# Patient Record
Sex: FEMALE | Race: WHITE | NOT HISPANIC OR LATINO | URBAN - METROPOLITAN AREA
[De-identification: names, ages, dates, MRNs, and addresses within clinical notes are randomized per-mention and may not be internally consistent; named-entity substitution may affect disease eponyms.]

---

## 2019-01-08 PROBLEM — Z00.00 ENCOUNTER FOR PREVENTIVE HEALTH EXAMINATION: Status: ACTIVE | Noted: 2019-01-08

## 2019-01-09 ENCOUNTER — OUTPATIENT (OUTPATIENT)
Dept: OUTPATIENT SERVICES | Facility: HOSPITAL | Age: 34
LOS: 1 days | Discharge: HOME | End: 2019-01-09

## 2019-01-09 ENCOUNTER — APPOINTMENT (OUTPATIENT)
Dept: OBGYN | Facility: CLINIC | Age: 34
End: 2019-01-09

## 2019-01-09 ENCOUNTER — NON-APPOINTMENT (OUTPATIENT)
Age: 34
End: 2019-01-09

## 2019-01-09 VITALS
HEART RATE: 86 BPM | RESPIRATION RATE: 18 BRPM | SYSTOLIC BLOOD PRESSURE: 126 MMHG | TEMPERATURE: 98 F | DIASTOLIC BLOOD PRESSURE: 74 MMHG

## 2019-01-09 VITALS
RESPIRATION RATE: 18 BRPM | DIASTOLIC BLOOD PRESSURE: 73 MMHG | SYSTOLIC BLOOD PRESSURE: 111 MMHG | HEART RATE: 85 BPM | TEMPERATURE: 98 F

## 2019-01-09 VITALS
HEIGHT: 64 IN | WEIGHT: 215 LBS | BODY MASS INDEX: 36.7 KG/M2 | SYSTOLIC BLOOD PRESSURE: 120 MMHG | DIASTOLIC BLOOD PRESSURE: 80 MMHG

## 2019-01-09 DIAGNOSIS — O24.919 UNSPECIFIED DIABETES MELLITUS IN PREGNANCY, UNSPECIFIED TRIMESTER: ICD-10-CM

## 2019-01-09 DIAGNOSIS — Z98.890 OTHER SPECIFIED POSTPROCEDURAL STATES: Chronic | ICD-10-CM

## 2019-01-09 DIAGNOSIS — Z34.90 ENCOUNTER FOR SUPERVISION OF NORMAL PREGNANCY, UNSPECIFIED, UNSPECIFIED TRIMESTER: ICD-10-CM

## 2019-01-09 DIAGNOSIS — O26.893 OTHER SPECIFIED PREGNANCY RELATED CONDITIONS, THIRD TRIMESTER: ICD-10-CM

## 2019-01-09 DIAGNOSIS — Z87.09 PERSONAL HISTORY OF OTHER DISEASES OF THE RESPIRATORY SYSTEM: ICD-10-CM

## 2019-01-09 DIAGNOSIS — Z78.9 OTHER SPECIFIED HEALTH STATUS: ICD-10-CM

## 2019-01-09 LAB
APPEARANCE UR: ABNORMAL
BILIRUB UR QL STRIP: NEGATIVE
BILIRUB UR-MCNC: NEGATIVE — SIGNIFICANT CHANGE UP
CLARITY UR: CLEAR
COD CRY URNS QL: ABNORMAL /HPF
COLLECTION METHOD: NORMAL
COLOR SPEC: YELLOW — SIGNIFICANT CHANGE UP
DIFF PNL FLD: NEGATIVE — SIGNIFICANT CHANGE UP
EPI CELLS # UR: ABNORMAL /HPF
GLUCOSE BLDC GLUCOMTR-MCNC: 137 MG/DL — HIGH (ref 70–99)
GLUCOSE UR QL: NEGATIVE — SIGNIFICANT CHANGE UP
GLUCOSE UR-MCNC: NEGATIVE
HCG UR QL: NEGATIVE EU/DL
HCT VFR BLD CALC: 32.7 % — LOW (ref 37–47)
HGB BLD-MCNC: 10.5 G/DL — LOW (ref 12–16)
HGB UR QL STRIP.AUTO: NEGATIVE
KETONES UR-MCNC: ABNORMAL
KETONES UR-MCNC: NEGATIVE
LEUKOCYTE ESTERASE UR QL STRIP: NEGATIVE
LEUKOCYTE ESTERASE UR-ACNC: ABNORMAL
MCHC RBC-ENTMCNC: 23.6 PG — LOW (ref 27–31)
MCHC RBC-ENTMCNC: 32.1 G/DL — SIGNIFICANT CHANGE UP (ref 32–37)
MCV RBC AUTO: 73.5 FL — LOW (ref 81–99)
NITRITE UR QL STRIP: NEGATIVE
NITRITE UR-MCNC: NEGATIVE — SIGNIFICANT CHANGE UP
NRBC # BLD: 0 /100 WBCS — SIGNIFICANT CHANGE UP (ref 0–0)
PH UR STRIP: 7
PH UR: 6 — SIGNIFICANT CHANGE UP (ref 5–8)
PLATELET # BLD AUTO: 261 K/UL — SIGNIFICANT CHANGE UP (ref 130–400)
PROT UR STRIP-MCNC: NEGATIVE
PROT UR-MCNC: ABNORMAL
RBC # BLD: 4.45 M/UL — SIGNIFICANT CHANGE UP (ref 4.2–5.4)
RBC # FLD: 14.3 % — SIGNIFICANT CHANGE UP (ref 11.5–14.5)
SP GR SPEC: >=1.03 — SIGNIFICANT CHANGE UP (ref 1.01–1.03)
SP GR UR STRIP: 1.03
UROBILINOGEN FLD QL: 1 (ref 0.2–0.2)
WBC # BLD: 5.84 K/UL — SIGNIFICANT CHANGE UP (ref 4.8–10.8)
WBC # FLD AUTO: 5.84 K/UL — SIGNIFICANT CHANGE UP (ref 4.8–10.8)
WBC UR QL: SIGNIFICANT CHANGE UP /HPF

## 2019-01-09 NOTE — OB PROVIDER TRIAGE NOTE - HISTORY OF PRESENT ILLNESS
32 yo  @36w4d w/ EDC 19 by LMP presents to L&D after being sent from [facility] for uncontrolled GDMA2. Pt is late transfer from [facility] where she reports she was receiving prenatal care. Pt reports pregnancy 32 yo  @36w4d w/ EDC 19 by LMP presents to L&D after being sent from [facility] for uncontrolled GDMA2. Pt is late transfer from [facility] where she reports she was receiving prenatal care. Pt reports pregnancy complicated by GDMA2. Currently taking Metformin 500mg BID, although occasionally skips PM dose. Pt does not perform fasting fingersticks, post prandial between 130-210. Fingerstick in clinic 86. Denies ctx, LOF, VB. Reports good fetal movement. Was checked in clinic, found to be 1.5/50/-3. Otherwise denies any other complications during this pregnancy. GBS unknown.

## 2019-01-09 NOTE — OB PROVIDER TRIAGE NOTE - NS_OBGYNHISTORY_OBGYN_ALL_OB_FT
Ob hx:   #1: , FT, , M, 3000gms, GDMA1  #2: 2017, FT, , M, 2000gms, GDMA2, IVF  SABx2, d&cx1    gyn hx: denies h/o cysts, fibroids, abnormal paps, STIs

## 2019-01-09 NOTE — OB PROVIDER TRIAGE NOTE - NSOBPROVIDERNOTE_OBGYN_ALL_OB_FT
34 yo  @36w4d, GBS unknown, late transfer, GDMA2 on metformin 500mg BID, questionable noncompliance, 34 yo  @36w4d, GBS unknown, late transfer, GDMA2 on metformin 500mg BID, questionable noncompliance,    - follow up at next scheduled clinic 32 yo  @36w4d, GBS unknown, late transfer, GDMA2 on metformin 500mg BID, questionable noncompliance,    - follow up at next scheduled clinic    OB ATTENDIN yo  at 36w4d GA by LMP, went today for 1st prenatal [place] in the [place], no prenatal records. Pt was dx with GDMA2 during her pregnancy, taking metformin 500mg BID, FS -210, does not check fasting. Otherwise uncomplicated prenatal care. Sent today from clinic since FS are not controlled according to pts verbal report, FS in clinic was 86. FS in L&D 137 (2h PP). Pt denies any symptoms, good fetal movements. Irregular ctx, felt only as tightening according to the pt. OB  x2 both complicated with GDM, #1 diet controlled, #2 on metformin. VS WNL, Reactive NST, irregular contractions, sonogram EFW 78%, BPP 8/8, MVP normal, vtx. Pt appears to be not well controlled but she needs to have a better FS log to be able to assess control and need for insulin to better control her glucose. Currently FS elevated but pt is stable, is not decompensated due to FS, and reassuring fetal wellbeing, adequate EFW, not polyhydramnios. Pt instructed on how to keep FS log, she has an official sonogram scheduled for tomorrow, will arrange for her to be seen in [facility] to continue prenatal care and to get diabetes education by diabetes nurse there. No need for inhouse monitoring or emergent FS control. Will draw and send prenatal labs. Pt not in labor, reexamined and found to be unchanged. Precautions given, fetal kick counts. 32 yo  @36w4d, GBS unknown, late transfer, GDMA2 on metformin 500mg BID, questionable noncompliance, not in labor, reassuring fetal and maternal status.    - f/up prenatal lab work  - antepartum sonogram tomorrow @1130  - f/up in high risk clinic, appointment to be made  - fasting and postprandial fingersticks  - c/w metformin 500mg BID  -  labor precautions  - [facility]KC instructions, maternal hydration and healthy diet encouraged    Dr. Friend and Dr. Loyd aware.     OB ATTENDIN yo  at 36w4d GA by LMP, went today for 1st [place] in the [place], no prenatal records. Pt was dx with GDMA2 during her pregnancy, taking metformin 500mg BID, FS -210, does not check fasting. Otherwise uncomplicated prenatal care. Sent today from clinic since FS are not controlled according to pts verbal report, FS in clinic was 86. FS in L&D 137 (2h PP). Pt denies any symptoms, good fetal movements. Irregular ctx, felt only as tightening according to the pt. OB  x2 both complicated with GDM, #1 diet controlled, #2 on metformin. VS WNL, Reactive NST, irregular contractions, sonogram EFW 78%, BPP 8/8, MVP normal, vtx. Pt appears to be not well controlled but she needs to have a better FS log to be able to assess control and need for insulin to better control her glucose. Currently FS elevated but pt is stable, is not decompensated due to FS, and reassuring fetal wellbeing, adequate EFW, not polyhydramnios. Pt instructed on how to keep FS log, she has an official sonogram scheduled for tomorrow, will arrange for her to be seen in [facility] to continue prenatal care and to get diabetes education by diabetes nurse there. No need for inhouse monitoring or emergent FS control. Will draw and send prenatal labs. Pt not in labor, reexamined and found to be unchanged. Precautions given, fetal kick counts.

## 2019-01-09 NOTE — OB PROVIDER TRIAGE NOTE - NSHPPHYSICALEXAM_GEN_ALL_CORE
Vital Signs Last 24 Hrs  T(F): 97.6 (09 Jan 2019 19:29), Max: 97.6 (09 Jan 2019 19:29)  HR: 86 (09 Jan 2019 19:29) (86 - 86)  BP: 126/74 (09 Jan 2019 19:29) (126/74 - 126/74)  RR: 18 (09 Jan 2019 19:29) (18 - 18)    udip: trace blood, trace protein    EFM: 130/mod kishan/+accel  toco: irregular  SVE: deferred  abd: soft, nontender, gravid, no palpable contractions Vital Signs Last 24 Hrs  T(F): 97.6 (09 Jan 2019 19:29), Max: 97.6 (09 Jan 2019 19:29)  HR: 86 (09 Jan 2019 19:29) (86 - 86)  BP: 126/74 (09 Jan 2019 19:29) (126/74 - 126/74)  RR: 18 (09 Jan 2019 19:29) (18 - 18)    udip: trace blood, trace protein    admission fingerstick: 137  EFM: 130/mod kishan/+accel  toco: irregular  SVE: deferred  abd: soft, nontender, gravid, no palpable contractions Vital Signs Last 24 Hrs  T(F): 97.6 (09 Jan 2019 19:29), Max: 97.6 (09 Jan 2019 19:29)  HR: 86 (09 Jan 2019 19:29) (86 - 86)  BP: 126/74 (09 Jan 2019 19:29) (126/74 - 126/74)  RR: 18 (09 Jan 2019 19:29) (18 - 18)    udip: trace blood, trace protein    admission fingerstick: 137  EFM: 130/mod kishan/+accel  toco: irregular  SVE: 2/50/-3  abd: soft, nontender, gravid, no palpable contractions

## 2019-01-09 NOTE — OB PROVIDER TRIAGE NOTE - ADDITIONAL INSTRUCTIONS
please take your metformin twice daily.   please take your fingerstick every morning before eating and 2 hours after breakfast, lunch, dinner.   Ultrasound appointment at 1130 on 1/10.   We will call you with your next clinic appointment.

## 2019-01-10 ENCOUNTER — APPOINTMENT (OUTPATIENT)
Dept: ANTEPARTUM | Facility: CLINIC | Age: 34
End: 2019-01-10

## 2019-01-10 LAB
ALBUMIN SERPL ELPH-MCNC: 3.5 G/DL — SIGNIFICANT CHANGE UP (ref 3.5–5.2)
ALP SERPL-CCNC: 133 U/L — HIGH (ref 30–115)
ALT FLD-CCNC: 8 U/L — SIGNIFICANT CHANGE UP (ref 0–41)
ANION GAP SERPL CALC-SCNC: 17 MMOL/L — HIGH (ref 7–14)
AST SERPL-CCNC: 11 U/L — SIGNIFICANT CHANGE UP (ref 0–41)
BILIRUB SERPL-MCNC: <0.2 MG/DL — SIGNIFICANT CHANGE UP (ref 0.2–1.2)
BLD GP AB SCN SERPL QL: SIGNIFICANT CHANGE UP
BUN SERPL-MCNC: 8 MG/DL — LOW (ref 10–20)
CALCIUM SERPL-MCNC: 9 MG/DL — SIGNIFICANT CHANGE UP (ref 8.5–10.1)
CHLORIDE SERPL-SCNC: 103 MMOL/L — SIGNIFICANT CHANGE UP (ref 98–110)
CO2 SERPL-SCNC: 19 MMOL/L — SIGNIFICANT CHANGE UP (ref 17–32)
CREAT SERPL-MCNC: <0.5 MG/DL — LOW (ref 0.7–1.5)
ESTIMATED AVERAGE GLUCOSE: 117 MG/DL — HIGH (ref 68–114)
GLUCOSE BLDC GLUCOMTR-MCNC: 87 MG/DL — SIGNIFICANT CHANGE UP (ref 70–99)
GLUCOSE SERPL-MCNC: 104 MG/DL — HIGH (ref 70–99)
HBA1C BLD-MCNC: 5.7 % — HIGH (ref 4–5.6)
HBV SURFACE AG SER-ACNC: SIGNIFICANT CHANGE UP
HIV 1 & 2 AB SERPL IA.RAPID: SIGNIFICANT CHANGE UP
POTASSIUM SERPL-MCNC: 4 MMOL/L — SIGNIFICANT CHANGE UP (ref 3.5–5)
POTASSIUM SERPL-SCNC: 4 MMOL/L — SIGNIFICANT CHANGE UP (ref 3.5–5)
PRENATAL SYPHILIS TEST: SIGNIFICANT CHANGE UP
PROT SERPL-MCNC: 6.2 G/DL — SIGNIFICANT CHANGE UP (ref 6–8)
RUBV IGG SER-ACNC: 3.1 INDEX — SIGNIFICANT CHANGE UP
RUBV IGG SER-IMP: POSITIVE — SIGNIFICANT CHANGE UP
SODIUM SERPL-SCNC: 139 MMOL/L — SIGNIFICANT CHANGE UP (ref 135–146)
TYPE + AB SCN PNL BLD: SIGNIFICANT CHANGE UP
VZV IGG FLD QL IA: 365.3 INDEX — SIGNIFICANT CHANGE UP
VZV IGG FLD QL IA: POSITIVE — SIGNIFICANT CHANGE UP

## 2019-01-11 DIAGNOSIS — Z3A.39 39 WEEKS GESTATION OF PREGNANCY: ICD-10-CM

## 2019-01-11 LAB
C TRACH RRNA SPEC QL NAA+PROBE: NOT DETECTED
CULTURE RESULTS: SIGNIFICANT CHANGE UP
N GONORRHOEA RRNA SPEC QL NAA+PROBE: NOT DETECTED
SOURCE AMPLIFICATION: NORMAL
SPECIMEN SOURCE: SIGNIFICANT CHANGE UP

## 2019-01-12 LAB
HEMOGLOBIN INTERPRETATION: SIGNIFICANT CHANGE UP
HGB A MFR BLD: 98.4 % — HIGH (ref 95.8–98)
HGB A2 MFR BLD: 1.6 % — LOW (ref 2–3.2)

## 2019-01-13 LAB
GAMMA INTERFERON BACKGROUND BLD IA-ACNC: 0.01 IU/ML — SIGNIFICANT CHANGE UP
M TB IFN-G BLD-IMP: NEGATIVE — SIGNIFICANT CHANGE UP
M TB IFN-G CD4+ BCKGRND COR BLD-ACNC: 0 IU/ML — SIGNIFICANT CHANGE UP
M TB IFN-G CD4+CD8+ BCKGRND COR BLD-ACNC: 0 IU/ML — SIGNIFICANT CHANGE UP
QUANT TB PLUS MITOGEN MINUS NIL: 8.65 IU/ML — SIGNIFICANT CHANGE UP

## 2019-01-14 LAB
GP B STREP DNA SPEC QL NAA+PROBE: NORMAL
GP B STREP DNA SPEC QL NAA+PROBE: NOT DETECTED
HPV HIGH+LOW RISK DNA PNL CVX: NOT DETECTED
SOURCE GBS: NORMAL

## 2019-02-01 ENCOUNTER — INPATIENT (INPATIENT)
Facility: HOSPITAL | Age: 34
LOS: 2 days | Discharge: HOME | End: 2019-02-04
Attending: OBSTETRICS & GYNECOLOGY | Admitting: OBSTETRICS & GYNECOLOGY

## 2019-02-01 ENCOUNTER — OUTPATIENT (OUTPATIENT)
Dept: OUTPATIENT SERVICES | Facility: HOSPITAL | Age: 34
LOS: 1 days | End: 2019-02-01
Payer: MEDICAID

## 2019-02-01 VITALS — TEMPERATURE: 99 F

## 2019-02-01 DIAGNOSIS — Z98.890 OTHER SPECIFIED POSTPROCEDURAL STATES: Chronic | ICD-10-CM

## 2019-02-01 LAB
BASOPHILS # BLD AUTO: 0.03 K/UL — SIGNIFICANT CHANGE UP (ref 0–0.2)
BASOPHILS NFR BLD AUTO: 0.6 % — SIGNIFICANT CHANGE UP (ref 0–1)
BLD GP AB SCN SERPL QL: SIGNIFICANT CHANGE UP
EOSINOPHIL # BLD AUTO: 0.18 K/UL — SIGNIFICANT CHANGE UP (ref 0–0.7)
EOSINOPHIL NFR BLD AUTO: 3.6 % — SIGNIFICANT CHANGE UP (ref 0–8)
GLUCOSE BLDC GLUCOMTR-MCNC: 82 MG/DL — SIGNIFICANT CHANGE UP (ref 70–99)
GLUCOSE BLDC GLUCOMTR-MCNC: 93 MG/DL — SIGNIFICANT CHANGE UP (ref 70–99)
HCT VFR BLD CALC: 34.4 % — LOW (ref 37–47)
HGB BLD-MCNC: 11.1 G/DL — LOW (ref 12–16)
IMM GRANULOCYTES NFR BLD AUTO: 1 % — HIGH (ref 0.1–0.3)
LYMPHOCYTES # BLD AUTO: 1.79 K/UL — SIGNIFICANT CHANGE UP (ref 1.2–3.4)
LYMPHOCYTES # BLD AUTO: 35.4 % — SIGNIFICANT CHANGE UP (ref 20.5–51.1)
MCHC RBC-ENTMCNC: 23.7 PG — LOW (ref 27–31)
MCHC RBC-ENTMCNC: 32.3 G/DL — SIGNIFICANT CHANGE UP (ref 32–37)
MCV RBC AUTO: 73.5 FL — LOW (ref 81–99)
MONOCYTES # BLD AUTO: 0.48 K/UL — SIGNIFICANT CHANGE UP (ref 0.1–0.6)
MONOCYTES NFR BLD AUTO: 9.5 % — HIGH (ref 1.7–9.3)
NEUTROPHILS # BLD AUTO: 2.53 K/UL — SIGNIFICANT CHANGE UP (ref 1.4–6.5)
NEUTROPHILS NFR BLD AUTO: 49.9 % — SIGNIFICANT CHANGE UP (ref 42.2–75.2)
NRBC # BLD: 0 /100 WBCS — SIGNIFICANT CHANGE UP (ref 0–0)
PLATELET # BLD AUTO: 205 K/UL — SIGNIFICANT CHANGE UP (ref 130–400)
PRENATAL SYPHILIS TEST: SIGNIFICANT CHANGE UP
RBC # BLD: 4.68 M/UL — SIGNIFICANT CHANGE UP (ref 4.2–5.4)
RBC # FLD: 15.4 % — HIGH (ref 11.5–14.5)
TYPE + AB SCN PNL BLD: SIGNIFICANT CHANGE UP
WBC # BLD: 5.06 K/UL — SIGNIFICANT CHANGE UP (ref 4.8–10.8)
WBC # FLD AUTO: 5.06 K/UL — SIGNIFICANT CHANGE UP (ref 4.8–10.8)

## 2019-02-01 PROCEDURE — G9001: CPT

## 2019-02-01 RX ORDER — OXYTOCIN 10 UNIT/ML
41.67 VIAL (ML) INJECTION
Qty: 20 | Refills: 0 | Status: DISCONTINUED | OUTPATIENT
Start: 2019-02-01 | End: 2019-02-02

## 2019-02-01 RX ORDER — OXYTOCIN 10 UNIT/ML
2 VIAL (ML) INJECTION
Qty: 30 | Refills: 0 | Status: DISCONTINUED | OUTPATIENT
Start: 2019-02-01 | End: 2019-02-04

## 2019-02-01 RX ORDER — SODIUM CHLORIDE 9 MG/ML
1000 INJECTION, SOLUTION INTRAVENOUS ONCE
Qty: 0 | Refills: 0 | Status: COMPLETED | OUTPATIENT
Start: 2019-02-01 | End: 2019-02-01

## 2019-02-01 RX ORDER — SODIUM CHLORIDE 9 MG/ML
1000 INJECTION, SOLUTION INTRAVENOUS
Qty: 0 | Refills: 0 | Status: DISCONTINUED | OUTPATIENT
Start: 2019-02-01 | End: 2019-02-02

## 2019-02-01 RX ADMIN — SODIUM CHLORIDE 125 MILLILITER(S): 9 INJECTION, SOLUTION INTRAVENOUS at 20:06

## 2019-02-01 RX ADMIN — SODIUM CHLORIDE 2000 MILLILITER(S): 9 INJECTION, SOLUTION INTRAVENOUS at 20:06

## 2019-02-01 RX ADMIN — Medication 2 MILLIUNIT(S)/MIN: at 20:15

## 2019-02-01 NOTE — OB PROVIDER H&P - ASSESSMENT
34yo  at 39w2d, GBS neg, late registrant, non-compliant, with contractions, not in labor, for IOL 34yo  at 39w2d, GBS neg, late registrant, with GDMA2, non-compliant, with contractions, not in labor, for IOL 34yo  at 39w2d, GBS neg, late registrant, with GDMA2, non-compliant, with contractions, not in labor, for IOL    -Admit to labor and delivery  -IV hydration and clear liquid diet  -Continuous efm and toco  -Pitocin for induction  -Pain management PRN        Dr. Olivas and Dr. Cam aware

## 2019-02-01 NOTE — PROGRESS NOTE ADULT - SUBJECTIVE AND OBJECTIVE BOX
PGY1 Note    Patient seen at bedside for evaluation of labor progression, doing well, no complaints.     T(F): 98.1 ( @ 19:06), Max: 98.6 ( @ 16:05)  HR: 74 ( @ 23:34)  BP: 114/71 ( @ 23:34) (95/56 - 132/66)  RR: 18 ( @ 19:06)  EFM: 130/mod/accel+  TOCO: q1-4min  SVE: 5/80/-2, AROM light meconium, vertex    Medications:  pitocin started at , now at 6mu/min    Labs:                        11.1   5.06  )-----------( 205      ( 2019 18:58 )             34.4     Prenatal Syphilis Test: Nonreact ( @ 18:58)  Type + Screen: O POS (19 @ 20:22)  Antibody Screen: NEG (19 @ 20:22)    A/P:   33y  at 39w6d, GBS neg, late registrant from [facility], noncompliant, GDMA2, on metformin 500 bid, on pitocin, in labor    -continue pitocin  -pain management prn  -cont efm/toco   -f/u pending labs (UDS and UA)  -cont to monitor vitals  -cont iv hydration    Dr. Olivas and Dr. Ferrara to be made aware.

## 2019-02-01 NOTE — OB PROVIDER H&P - NSHPPHYSICALEXAM_GEN_ALL_CORE
Physical exam:    Vital Signs Last 24 Hrs  T(F): 98.6 (01 Feb 2019 16:27), Max: 98.6 (01 Feb 2019 16:05)  HR: 84 (01 Feb 2019 16:27) (84 - 84)  BP: 118/75 (01 Feb 2019 16:27) (118/75 - 118/75)  RR: 20 (01 Feb 2019 16:27) (20 - 20)  Udip  Gen: NAD  Abdomen: Soft, nontender, gravid. EFW by leopold's [id number]  Sono: vertex, fundal placenta, BPP 8/8, MVP 4.83cm, EFW 4060g (84%)  VE: 3/70/-2,vertex, intact  EFM: 120/mod/+accels  toco: occ contractions Physical exam:    Vital Signs Last 24 Hrs  T(F): 98.6 (01 Feb 2019 16:27), Max: 98.6 (01 Feb 2019 16:05)  HR: 84 (01 Feb 2019 16:27) (84 - 84)  BP: 118/75 (01 Feb 2019 16:27) (118/75 - 118/75)  RR: 20 (01 Feb 2019 16:27) (20 - 20)  Udip trace blood, trace leuks  FS 93  Gen: NAD  Abdomen: Soft, nontender, gravid. EFW by leopold's 4100g  Sono: vertex, fundal placenta, BPP 8/8, MVP 4.83cm, EFW 4060g (84%)  VE: 3/70/-2,vertex, intact  EFM: 120/mod/+accels  toco: occ contractions

## 2019-02-01 NOTE — OB PROVIDER IHI INDUCTION/AUGMENTATION NOTE - NS_CHECKALL_OBGYN_ALL_OB
Contractions pattern was reviewed/FHR was reviewed/Induction / Augmentation was discussed/H&P was completed/Order was written

## 2019-02-01 NOTE — OB PROVIDER H&P - HISTORY OF PRESENT ILLNESS
32yo  at 39w6d by LMP, late registrant from [facility], presents to labor and delivery for irregular contractions that started last night. Contractions are >30min apart radiating to the back. She denies LOF, vaginal bleeding. Reports good fetal movement. Patient has GDMA2 diagnosed in [facility], on Metformin 500 BID, sometimes skips the PM dose, last taken at . Only records fingersticks 2h postprandial; ranging 135-147.  Was seen at [facility] once for prenatal visit and was then sent to labor and delivery in January at 36w. She was recommended to follow up with high risk for better glucose control, but she never did despite the ob team's multiple attempts to get in touch with her. She denies any other complications in this pregnancy. GBS negative 32yo  at 39w6d by LMP, late registrant from [facility], presents to labor and delivery for irregular contractions that started last night. Contractions are >30min apart radiating to the back. She denies LOF, vaginal bleeding. Reports good fetal movement. Patient has GDMA2 diagnosed in [facility], on Metformin 500 BID, sometimes skips the PM dose, last taken 19 . Only records fingersticks 2h postprandial; ranging 135-147.  Was seen at [facility] once for prenatal visit and was then sent to labor and delivery in January at 36w. She was recommended to follow up with high risk for better glucose control, but she never did despite the ob team's multiple attempts to get in touch with her. She denies any other complications in this pregnancy. GBS negative 32yo  at 39w6d by LMP, late registrant from [facility], presents to labor and delivery for irregular contractions that started last night. Contractions are >30min apart radiating to the back. She denies LOF, vaginal bleeding. Reports good fetal movement. Patient has GDMA2 diagnosed in [facility], on Metformin 500 BID, sometimes skips the PM dose, last taken 19 . Only records fingersticks 2h postprandial; ranging 135-147.  Was seen at [facility] once for first prenatal visit in January at 36w and was then sent to labor and delivery. She was recommended to follow up with high risk for better glucose control, but she never did despite the ob team's multiple attempts to get in touch with her. She denies any other complications in this pregnancy. GBS negative

## 2019-02-02 LAB
AMPHET UR-MCNC: NEGATIVE — SIGNIFICANT CHANGE UP
APPEARANCE UR: ABNORMAL
BACTERIA # UR AUTO: ABNORMAL /HPF
BARBITURATES UR SCN-MCNC: NEGATIVE — SIGNIFICANT CHANGE UP
BASOPHILS # BLD AUTO: 0.02 K/UL — SIGNIFICANT CHANGE UP (ref 0–0.2)
BASOPHILS NFR BLD AUTO: 0.3 % — SIGNIFICANT CHANGE UP (ref 0–1)
BENZODIAZ UR-MCNC: NEGATIVE — SIGNIFICANT CHANGE UP
BILIRUB UR-MCNC: NEGATIVE — SIGNIFICANT CHANGE UP
COCAINE METAB.OTHER UR-MCNC: NEGATIVE — SIGNIFICANT CHANGE UP
COLOR SPEC: YELLOW — SIGNIFICANT CHANGE UP
DIFF PNL FLD: NEGATIVE — SIGNIFICANT CHANGE UP
EOSINOPHIL # BLD AUTO: 0.15 K/UL — SIGNIFICANT CHANGE UP (ref 0–0.7)
EOSINOPHIL NFR BLD AUTO: 2.5 % — SIGNIFICANT CHANGE UP (ref 0–8)
EPI CELLS # UR: ABNORMAL /HPF
GLUCOSE BLDC GLUCOMTR-MCNC: 84 MG/DL — SIGNIFICANT CHANGE UP (ref 70–99)
GLUCOSE UR QL: NEGATIVE MG/DL — SIGNIFICANT CHANGE UP
HCT VFR BLD CALC: 25.6 % — LOW (ref 37–47)
HGB BLD-MCNC: 8.3 G/DL — LOW (ref 12–16)
IMM GRANULOCYTES NFR BLD AUTO: 0.7 % — HIGH (ref 0.1–0.3)
KETONES UR-MCNC: NEGATIVE — SIGNIFICANT CHANGE UP
LEUKOCYTE ESTERASE UR-ACNC: NEGATIVE — SIGNIFICANT CHANGE UP
LYMPHOCYTES # BLD AUTO: 1.62 K/UL — SIGNIFICANT CHANGE UP (ref 1.2–3.4)
LYMPHOCYTES # BLD AUTO: 27.4 % — SIGNIFICANT CHANGE UP (ref 20.5–51.1)
MCHC RBC-ENTMCNC: 24 PG — LOW (ref 27–31)
MCHC RBC-ENTMCNC: 32.4 G/DL — SIGNIFICANT CHANGE UP (ref 32–37)
MCV RBC AUTO: 74 FL — LOW (ref 81–99)
METHADONE UR-MCNC: NEGATIVE — SIGNIFICANT CHANGE UP
MONOCYTES # BLD AUTO: 0.61 K/UL — HIGH (ref 0.1–0.6)
MONOCYTES NFR BLD AUTO: 10.3 % — HIGH (ref 1.7–9.3)
NEUTROPHILS # BLD AUTO: 3.48 K/UL — SIGNIFICANT CHANGE UP (ref 1.4–6.5)
NEUTROPHILS NFR BLD AUTO: 58.8 % — SIGNIFICANT CHANGE UP (ref 42.2–75.2)
NITRITE UR-MCNC: NEGATIVE — SIGNIFICANT CHANGE UP
NRBC # BLD: 0 /100 WBCS — SIGNIFICANT CHANGE UP (ref 0–0)
OPIATES UR-MCNC: NEGATIVE — SIGNIFICANT CHANGE UP
PCP SPEC-MCNC: SIGNIFICANT CHANGE UP
PH UR: 6.5 — SIGNIFICANT CHANGE UP (ref 5–8)
PLATELET # BLD AUTO: 201 K/UL — SIGNIFICANT CHANGE UP (ref 130–400)
PROPOXYPHENE QUALITATIVE URINE RESULT: NEGATIVE — SIGNIFICANT CHANGE UP
PROT UR-MCNC: NEGATIVE MG/DL — SIGNIFICANT CHANGE UP
RBC # BLD: 3.46 M/UL — LOW (ref 4.2–5.4)
RBC # FLD: 15 % — HIGH (ref 11.5–14.5)
SP GR SPEC: 1.01 — SIGNIFICANT CHANGE UP (ref 1.01–1.03)
UROBILINOGEN FLD QL: 1 MG/DL (ref 0.2–0.2)
WBC # BLD: 5.92 K/UL — SIGNIFICANT CHANGE UP (ref 4.8–10.8)
WBC # FLD AUTO: 5.92 K/UL — SIGNIFICANT CHANGE UP (ref 4.8–10.8)

## 2019-02-02 RX ORDER — IBUPROFEN 200 MG
600 TABLET ORAL EVERY 6 HOURS
Qty: 0 | Refills: 0 | Status: DISCONTINUED | OUTPATIENT
Start: 2019-02-02 | End: 2019-02-04

## 2019-02-02 RX ORDER — DOCUSATE SODIUM 100 MG
100 CAPSULE ORAL
Qty: 0 | Refills: 0 | Status: DISCONTINUED | OUTPATIENT
Start: 2019-02-02 | End: 2019-02-04

## 2019-02-02 RX ORDER — BENZOCAINE 10 %
1 GEL (GRAM) MUCOUS MEMBRANE EVERY 6 HOURS
Qty: 0 | Refills: 0 | Status: DISCONTINUED | OUTPATIENT
Start: 2019-02-02 | End: 2019-02-04

## 2019-02-02 RX ORDER — DIBUCAINE 1 %
1 OINTMENT (GRAM) RECTAL EVERY 4 HOURS
Qty: 0 | Refills: 0 | Status: DISCONTINUED | OUTPATIENT
Start: 2019-02-02 | End: 2019-02-04

## 2019-02-02 RX ORDER — OXYCODONE AND ACETAMINOPHEN 5; 325 MG/1; MG/1
1 TABLET ORAL
Qty: 0 | Refills: 0 | Status: DISCONTINUED | OUTPATIENT
Start: 2019-02-02 | End: 2019-02-04

## 2019-02-02 RX ORDER — DIPHENHYDRAMINE HCL 50 MG
25 CAPSULE ORAL EVERY 6 HOURS
Qty: 0 | Refills: 0 | Status: DISCONTINUED | OUTPATIENT
Start: 2019-02-02 | End: 2019-02-04

## 2019-02-02 RX ORDER — SIMETHICONE 80 MG/1
80 TABLET, CHEWABLE ORAL EVERY 6 HOURS
Qty: 0 | Refills: 0 | Status: DISCONTINUED | OUTPATIENT
Start: 2019-02-02 | End: 2019-02-04

## 2019-02-02 RX ORDER — ACETAMINOPHEN 500 MG
650 TABLET ORAL EVERY 6 HOURS
Qty: 0 | Refills: 0 | Status: DISCONTINUED | OUTPATIENT
Start: 2019-02-02 | End: 2019-02-04

## 2019-02-02 RX ORDER — OXYTOCIN 10 UNIT/ML
41.67 VIAL (ML) INJECTION
Qty: 20 | Refills: 0 | Status: DISCONTINUED | OUTPATIENT
Start: 2019-02-02 | End: 2019-02-04

## 2019-02-02 RX ORDER — GLYCERIN ADULT
1 SUPPOSITORY, RECTAL RECTAL AT BEDTIME
Qty: 0 | Refills: 0 | Status: DISCONTINUED | OUTPATIENT
Start: 2019-02-02 | End: 2019-02-04

## 2019-02-02 RX ORDER — MAGNESIUM HYDROXIDE 400 MG/1
30 TABLET, CHEWABLE ORAL
Qty: 0 | Refills: 0 | Status: DISCONTINUED | OUTPATIENT
Start: 2019-02-02 | End: 2019-02-04

## 2019-02-02 RX ORDER — LANOLIN
1 OINTMENT (GRAM) TOPICAL EVERY 6 HOURS
Qty: 0 | Refills: 0 | Status: DISCONTINUED | OUTPATIENT
Start: 2019-02-02 | End: 2019-02-04

## 2019-02-02 RX ORDER — AER TRAVELER 0.5 G/1
1 SOLUTION RECTAL; TOPICAL EVERY 4 HOURS
Qty: 0 | Refills: 0 | Status: DISCONTINUED | OUTPATIENT
Start: 2019-02-02 | End: 2019-02-04

## 2019-02-02 RX ADMIN — Medication 1 TABLET(S): at 12:46

## 2019-02-02 RX ADMIN — Medication 600 MILLIGRAM(S): at 04:24

## 2019-02-02 RX ADMIN — Medication 600 MILLIGRAM(S): at 16:55

## 2019-02-02 NOTE — PROGRESS NOTE ADULT - SUBJECTIVE AND OBJECTIVE BOX
PGY 4 Note    pt evaluated at bedside for a few variable decels, patient examined s/p epidural, now 7/90/-1. Juarez catheter to be placed. Placed on left lateral with O2. Tracing recovered, Pitocin   discontinued.     T(F): --  HR: 77 (01:15)  BP: 123/59 (01:15)  RR: --  CAPILLARY BLOOD GLUCOSE  82 (01 Feb 2019 22:18)    POCT Blood Glucose.: 82 mg/dL (01 Feb 2019 22:36)      EFM: 120/mod/no acc, with variable decels to the 90 BPM  TOCO: q2-3 min  SVE: 7/90/-1. soft, OA    Labs:                        11.1   5.06  )-----------( 205      ( 01 Feb 2019 18:58 )             34.4       Prenatal Syphilis Test: Nonreact (02-01-19 @ 18:58)    Meds:   (Floorstock) 1 each &lt;see task&gt; GivenOnce  (Floorstock) 1 each &lt;see task&gt; GivenOnce  lactated ringers Bolus 1000 milliLiter(s) IV Bolus once

## 2019-02-02 NOTE — PROGRESS NOTE ADULT - ASSESSMENT
34 yo  at 40w0d, GBS negative, late registrant, non compliant, GDMA2, s/p AROM and pitocin, with cat 2 tracing  - continue with resuscitation  - pain management PRN  - reassess in 2 hours or PRN  - IV fludis  - Fingersticks q2h    Dr. lynn to be made aware

## 2019-02-02 NOTE — OB PROVIDER DELIVERY SUMMARY - NSPROVIDERDELIVERYNOTE_OBGYN_ALL_OB_FT
Patient was fully dilated and pushing. Fetal head was OA and restituted to ROT. The anterior and posterior shoulders delivered, followed by the remaining body atraumatically. Delayed cord clamping was performed, and then clamped and cut. Cord blood gases collected x2. The  was handed to the pediatric team. The placenta delivered intact with membranes. Pitocin was administered IV (20U) and IM (20U). Uterus massaged, fundus found to be firm. Cervix, vagina and perineum inspected and no lacerations were noted.    Viable male infant delivered, weighing 3990 grams, with APGARs 9/9    Lacteration: none   EBL 300cc    Dr. Olivas and Dr. Ferrara present for the delivery.

## 2019-02-03 NOTE — PROGRESS NOTE ADULT - ASSESSMENT
32 yo now , s/p , PPD#1, GDMA2, non-compliant, doing well.    - pain management PRN  - c/w regular diet, PO hydration  - encourage ambulation  - monitor vital signs, bleeding  - anticipate d/c tomorrow    Dr. Jackson and Dr. Wood aware.

## 2019-02-03 NOTE — PROGRESS NOTE ADULT - SUBJECTIVE AND OBJECTIVE BOX
PGY 1 Post Partum note    Subjective: Pt seen and examined at bedside. Doing well, pain controlled. Pt is voiding, passing flatus no BM, tolerating regular diet, ambulating, breast feeding. Denies CP, SOB, N/V, LE pain.    Physical exam:    Vital Signs Last 24 Hrs  T(F): 98.4 (03 Feb 2019 08:06), Max: 98.4 (03 Feb 2019 08:06)  HR: 89 (03 Feb 2019 08:06) (81 - 89)  BP: 106/56 (03 Feb 2019 08:06) (106/56 - 124/71)  BP(mean): 90 (02 Feb 2019 15:30) (90 - 90)  RR: 18 (03 Feb 2019 08:06) (18 - 18)    Gen: NAD  CVS: s1s2, rrr  Lungs: ctab, no r/r/w  Abdomen: Soft, appropriately tender, no distension , firm uterine fundus below umbilicus, +BS  Pelvic: Normal lochia rubra   Ext: No calf tenderness    Diet: Regular  meds:   ibuprofen  Tablet.   600 milliGRAM(s) Oral (02-02-19 @ 16:55)    prenatal multivitamin   1 Tablet(s) Oral (02-02-19 @ 12:46)        LABS:                        8.3    5.92  )-----------( 201      ( 02 Feb 2019 19:15 )             25.6                         11.1   5.06  )-----------( 205      ( 01 Feb 2019 18:58 )             34.4

## 2019-02-04 ENCOUNTER — TRANSCRIPTION ENCOUNTER (OUTPATIENT)
Age: 34
End: 2019-02-04

## 2019-02-04 VITALS
SYSTOLIC BLOOD PRESSURE: 115 MMHG | HEART RATE: 91 BPM | TEMPERATURE: 97 F | RESPIRATION RATE: 20 BRPM | DIASTOLIC BLOOD PRESSURE: 57 MMHG

## 2019-02-04 RX ORDER — FERROUS SULFATE 325(65) MG
1 TABLET ORAL
Qty: 60 | Refills: 1 | OUTPATIENT
Start: 2019-02-04 | End: 2019-04-04

## 2019-02-04 RX ORDER — IBUPROFEN 200 MG
1 TABLET ORAL
Qty: 50 | Refills: 0 | OUTPATIENT
Start: 2019-02-04

## 2019-02-04 RX ORDER — DOCUSATE SODIUM 100 MG
1 CAPSULE ORAL
Qty: 60 | Refills: 1 | OUTPATIENT
Start: 2019-02-04 | End: 2019-04-04

## 2019-02-04 RX ORDER — METFORMIN HYDROCHLORIDE 850 MG/1
1 TABLET ORAL
Qty: 0 | Refills: 0 | COMMUNITY

## 2019-02-04 RX ADMIN — Medication 600 MILLIGRAM(S): at 06:30

## 2019-02-04 RX ADMIN — Medication 1 TABLET(S): at 12:34

## 2019-02-04 RX ADMIN — Medication 600 MILLIGRAM(S): at 05:10

## 2019-02-04 NOTE — DISCHARGE NOTE OB - PLAN OF CARE
Healthy mom and baby Nothing in the vagina for 6 weeks. No tampons, no douching, no sex. No swimming, no tub-baths. May shower.  If fever 100.4F or greater, excessive vaginal bleeding, or severe abdominal pain, call your Ob/Gyn or come to ED or call 911.  Please see your doctor in 6 weeks for your regular postpartum visit. Please make sure to get the 75 mg 2 hour glucose tolerance test.

## 2019-02-04 NOTE — DISCHARGE NOTE OB - MEDICATION SUMMARY - MEDICATIONS TO TAKE
I will START or STAY ON the medications listed below when I get home from the hospital:    ibuprofen 600 mg oral tablet  -- 1 tab(s) by mouth every 6 hours, As needed, Moderate Pain (4 - 6) MDD:1 tablet every 6 hours as needed  -- Indication: For pain    docusate sodium 100 mg oral capsule  -- 1 cap(s) by mouth 2 times a day, As needed, Stool Softening MDD:1 tablet every 12 hours as needed  -- Indication: For constipation I will START or STAY ON the medications listed below when I get home from the hospital:    ibuprofen 600 mg oral tablet  -- 1 tab(s) by mouth every 6 hours, As needed, Moderate Pain (4 - 6) MDD:1 tablet every 6 hours as needed  -- Indication: For pain    ferrous sulfate 325 mg (65 mg elemental iron) oral delayed release tablet  -- 1 tab(s) by mouth 2 times a day   -- May discolor urine or feces.  Swallow whole.  Do not crush.    -- Indication: For anemia    docusate sodium 100 mg oral capsule  -- 1 cap(s) by mouth 2 times a day, As needed, Stool Softening MDD:1 tablet every 12 hours as needed  -- Indication: For constipation

## 2019-02-04 NOTE — PROGRESS NOTE ADULT - SUBJECTIVE AND OBJECTIVE BOX
Chief Complaint: Postpartum    HPI: Pt doing well, pain well controlled. No overnight events, no acute complaints. Tolerating regular diet, ambulating, voiding. Passed flatus, passed BM. Both bottle and breastfeeding.     ROS: Denies cardiovascular or respiratory symptoms    PAST MEDICAL & SURGICAL HISTORY:  No pertinent past medical history  History of D&C    Physical Exam  Vital Signs Last 24 Hrs  T(F): 96.8 (2019 00:59), Max: 98.4 (2019 08:06)  HR: 94 (2019 00:59) (88 - 94)  BP: 127/79 (2019 00:59) (106/56 - 127/79)  RR: 18 (2019 00:59) (18 - 18)    Physical exam:  General - AAOx3, NAD  Heart - S1S2, RRR  Lungs - CTA BL  Abdomen:  - Soft, nontender, nondistended, BS+  - Fundus firm, nontender, below the umbilicus  Pelvis/Vagina - Normal Lochia  Extremities: No calf tenderness, no swelling bilaterally     Labs:                        8.3    5.92  )-----------( 201      ( 2019 19:15 )             25.6                         11.1   5.06  )-----------( 205      ( 2019 18:58 )             34.4     Type + Screen: O POS (19 @ 20:22)  Antibody Screen: NEG (19 @ 20:22)    Assessment:   34 yo now , s/p , PPD#2, GDMA2, non-compliant, recovering well,     - pain management PRN  - c/w regular diet, PO hydration  - encourage ambulation  - monitor vital signs, bleeding  - anticipate d/c home today  - script for 2 hour PP 75 mg glucose tolerance test sent    Dr. Friend and Dr. Jha to be made aware. Chief Complaint: Postpartum    HPI: Pt doing well, pain well controlled. No overnight events, no acute complaints. Tolerating regular diet, ambulating, voiding. Passed flatus, passed BM. Both bottle and breastfeeding.     ROS: Denies cardiovascular or respiratory symptoms    PAST MEDICAL & SURGICAL HISTORY:  No pertinent past medical history  History of D&C    Physical Exam  Vital Signs Last 24 Hrs  T(F): 96.8 (2019 00:59), Max: 98.4 (2019 08:06)  HR: 94 (2019 00:59) (88 - 94)  BP: 127/79 (2019 00:59) (106/56 - 127/79)  RR: 18 (2019 00:59) (18 - 18)    Physical exam:  General - AAOx3, NAD  Heart - S1S2, RRR  Lungs - CTA BL  Abdomen:  - Soft, nontender, nondistended, BS+  - Fundus firm, nontender, below the umbilicus  Pelvis/Vagina - Normal Lochia  Extremities: No calf tenderness, no swelling bilaterally     Labs:                        8.3    5.92  )-----------( 201      ( 2019 19:15 )             25.6                         11.1   5.06  )-----------( 205      ( 2019 18:58 )             34.4     Type + Screen: O POS (19 @ 20:22)  Antibody Screen: NEG (19 @ 20:22)    Assessment and Plan:   32 yo now , s/p , PPD#2, GDMA2, non-compliant, recovering well,     - pain management PRN  - c/w regular diet, PO hydration  - encourage ambulation  - monitor vital signs, bleeding  - anticipate d/c home today  - script for 2 hour PP 75 mg glucose tolerance test sent    Dr. Friend and Dr. Jha to be made aware.     Slovenian  number [id number] Chief Complaint: Postpartum    HPI: Pt doing well, pain well controlled. No overnight events, no acute complaints. Tolerating regular diet, ambulating, voiding. Passed flatus, passed BM. Both bottle and breastfeeding.     ROS: Denies cardiovascular or respiratory symptoms    PAST MEDICAL & SURGICAL HISTORY:  No pertinent past medical history  History of D&C    Physical Exam  Vital Signs Last 24 Hrs  T(F): 96.8 (2019 00:59), Max: 98.4 (2019 08:06)  HR: 94 (2019 00:59) (88 - 94)  BP: 127/79 (2019 00:59) (106/56 - 127/79)  RR: 18 (2019 00:59) (18 - 18)    Physical exam:  General - AAOx3, NAD  Heart - S1S2, RRR  Lungs - CTA BL  Abdomen:  - Soft, nontender, nondistended, BS+  - Fundus firm, nontender, below the umbilicus  Pelvis/Vagina - Normal Lochia  Extremities: No calf tenderness, symmetrical swelling of lower extremities bilaterally     Labs:                        8.3    5.92  )-----------( 201      ( 2019 19:15 )             25.6                         11.1   5.06  )-----------( 205      ( 2019 18:58 )             34.4     Type + Screen: O POS (19 @ 20:22)  Antibody Screen: NEG (19 @ 20:22)    Assessment and Plan:   34 yo now , s/p , PPD#2, GDMA2, non-compliant, recovering well,     - pain management PRN  - c/w regular diet, PO hydration  - encourage ambulation  - monitor vital signs, bleeding  - anticipate d/c home today  - script for 2 hour PP 75 mg glucose tolerance test sent  - leg elevation recommended for leg swelling    Dr. Friend and Dr. Jha to be made aware.     Lao  number [id number] Chief Complaint: Postpartum    HPI: Pt doing well, pain well controlled. No overnight events, no acute complaints. Tolerating regular diet, ambulating, voiding. Passed flatus, passed BM. Both bottle and breastfeeding.     ROS: Denies cardiovascular or respiratory symptoms    PAST MEDICAL & SURGICAL HISTORY:  No pertinent past medical history  History of D&C    Physical Exam  Vital Signs Last 24 Hrs  T(F): 96.8 (2019 00:59), Max: 98.4 (2019 08:06)  HR: 94 (2019 00:59) (88 - 94)  BP: 127/79 (2019 00:59) (106/56 - 127/79)  RR: 18 (2019 00:59) (18 - 18)    Physical exam:  General - AAOx3, NAD  Heart - S1S2, RRR  Lungs - CTA BL  Abdomen:  - Soft, nontender, nondistended, BS+  - Fundus firm, nontender, below the umbilicus  Pelvis/Vagina - Normal Lochia  Extremities: No calf tenderness    Labs:                        8.3    5.92  )-----------( 201      ( 2019 19:15 )             25.6                         11.1   5.06  )-----------( 205      ( 2019 18:58 )             34.4     Type + Screen: O POS (19 @ 20:22)  Antibody Screen: NEG (19 @ 20:22)    Assessment and Plan:   34 yo now , s/p , PPD#2, GDMA2, non-compliant, recovering well,     - pain management PRN  - c/w regular diet, PO hydration  - encourage ambulation  - monitor vital signs, bleeding  - anticipate d/c home today  - script for 2 hour PP 75 mg glucose tolerance test sent    Dr. Friend and Dr. Jha to be made aware.     Albanian  number [id number]

## 2019-02-04 NOTE — DISCHARGE NOTE OB - CARE PROVIDER_API CALL
Ave Strickland)  Obstetrics and Gynecology  [address] [place] [address]  Phone: [phone]  Fax: [phone]

## 2019-02-04 NOTE — DISCHARGE NOTE OB - PATIENT PORTAL LINK FT
You can access the [facility], offered by [facility], by registering with the following website: [email or web address]

## 2019-02-05 DIAGNOSIS — Z71.89 OTHER SPECIFIED COUNSELING: ICD-10-CM

## 2019-02-07 DIAGNOSIS — Z33.1 PREGNANT STATE, INCIDENTAL: ICD-10-CM

## 2019-02-07 DIAGNOSIS — Z3A.39 39 WEEKS GESTATION OF PREGNANCY: ICD-10-CM

## 2019-02-07 DIAGNOSIS — Z88.0 ALLERGY STATUS TO PENICILLIN: ICD-10-CM

## 2019-02-07 DIAGNOSIS — Z91.14 PATIENT'S OTHER NONCOMPLIANCE WITH MEDICATION REGIMEN: ICD-10-CM

## 2021-09-07 NOTE — OB PROVIDER TRIAGE NOTE - NS_EDDCALCULATED_OBGYN_ALL_OB
LM for pt to return call re: GI scheduling  Provided direct phone number on pt's message for GI scheduling 
Patient left message wanting to reschedule her colonoscopy  Please call patient   Thanks
LMP

## 2023-06-12 NOTE — DISCHARGE NOTE OB - CARE PLAN
Principal Discharge DX:	Vaginal delivery  Goal:	Healthy mom and baby  Assessment and plan of treatment:	Nothing in the vagina for 6 weeks. No tampons, no douching, no sex. No swimming, no tub-baths. May shower.  If fever 100.4F or greater, excessive vaginal bleeding, or severe abdominal pain, call your Ob/Gyn or come to ED or call 911.  Please see your doctor in 6 weeks for your regular postpartum visit. Please make sure to get the 75 mg 2 hour glucose tolerance test.
Adult

## 2025-01-08 NOTE — OB RN DELIVERY SUMMARY - NSSKINTOSKINA_OBGYN_ALL_OB_START_DATE
Fax confirmation received for both faxes. Date and time of transmission to Dr. Edmond's office: Wednesday, Jan 8, 2025 @ 1:27:26 pm. Date and time of transmission to [facility] office: Wednesday, Jan 8, 2025 @ 1:17:20 pm.    02-Feb-2019 02:05

## 2025-04-21 NOTE — OB PROVIDER DELIVERY SUMMARY - [facility]_OBGYN_ALL_OB
Rx Refill Note  Requested Prescriptions     Pending Prescriptions Disp Refills    Dulaglutide 3 MG/0.5ML solution auto-injector 6 mL 0     Sig: Inject 3 mg under the skin into the appropriate area as directed 1 (One) Time Per Week.      Last office visit with prescribing clinician: 12/26/2024   Last telemedicine visit with prescribing clinician: Visit date not found   Next office visit with prescribing clinician: 6/26/2025                         Would you like a call back once the refill request has been completed: [] Yes [] No    If the office needs to give you a call back, can they leave a voicemail: [] Yes [] No    Keila Murillo MA  04/21/25, 08:31 EDT    No